# Patient Record
Sex: MALE | Race: WHITE | NOT HISPANIC OR LATINO | Employment: OTHER | URBAN - METROPOLITAN AREA
[De-identification: names, ages, dates, MRNs, and addresses within clinical notes are randomized per-mention and may not be internally consistent; named-entity substitution may affect disease eponyms.]

---

## 2024-08-26 DIAGNOSIS — G40.909 SEIZURE DISORDER: Primary | ICD-10-CM

## 2025-01-02 ENCOUNTER — OFFICE VISIT (OUTPATIENT)
Dept: NEUROLOGY | Facility: CLINIC | Age: 55
End: 2025-01-02
Payer: OTHER GOVERNMENT

## 2025-01-02 VITALS
HEIGHT: 70 IN | DIASTOLIC BLOOD PRESSURE: 80 MMHG | BODY MASS INDEX: 25.05 KG/M2 | SYSTOLIC BLOOD PRESSURE: 110 MMHG | WEIGHT: 175 LBS

## 2025-01-02 DIAGNOSIS — G40.909 SEIZURE DISORDER: Primary | ICD-10-CM

## 2025-01-02 DIAGNOSIS — Z98.890 HISTORY OF CRANIOTOMY: ICD-10-CM

## 2025-01-02 DIAGNOSIS — Q04.6 COLLOID CYST OF THIRD VENTRICLE: ICD-10-CM

## 2025-01-02 PROCEDURE — 99999 PR PBB SHADOW E&M-EST. PATIENT-LVL III: CPT | Mod: PBBFAC,,, | Performed by: SPECIALIST

## 2025-01-02 PROCEDURE — 99213 OFFICE O/P EST LOW 20 MIN: CPT | Mod: PBBFAC | Performed by: SPECIALIST

## 2025-01-02 PROCEDURE — 99205 OFFICE O/P NEW HI 60 MIN: CPT | Mod: S$PBB,,, | Performed by: SPECIALIST

## 2025-01-02 RX ORDER — EMPAGLIFLOZIN 10 MG/1
TABLET, FILM COATED ORAL
COMMUNITY

## 2025-01-02 RX ORDER — ZONISAMIDE 100 MG/1
1 CAPSULE ORAL 2 TIMES DAILY
COMMUNITY

## 2025-01-02 RX ORDER — ROSUVASTATIN CALCIUM 20 MG/1
1 TABLET, COATED ORAL DAILY
COMMUNITY

## 2025-01-02 RX ORDER — PIOGLITAZONEHYDROCHLORIDE 30 MG/1
1 TABLET ORAL DAILY
COMMUNITY

## 2025-01-02 RX ORDER — SILDENAFIL 100 MG/1
TABLET, FILM COATED ORAL
COMMUNITY

## 2025-01-02 RX ORDER — TESTOSTERONE CYPIONATE 200 MG/ML
200 INJECTION, SOLUTION INTRAMUSCULAR
COMMUNITY
Start: 2024-11-26

## 2025-01-02 RX ORDER — LOSARTAN POTASSIUM 50 MG/1
1 TABLET ORAL DAILY
COMMUNITY

## 2025-01-02 RX ORDER — ANASTROZOLE 1 MG/1
TABLET ORAL
COMMUNITY

## 2025-01-02 RX ORDER — CITALOPRAM 20 MG/1
1 TABLET, FILM COATED ORAL DAILY
COMMUNITY

## 2025-01-02 NOTE — PROGRESS NOTES
"Subjective:      @Patient ID: Geoffrey Frank is a 54 y.o. male.    Chief Complaint/referral reason/HPI:   Chief Complaint   Patient presents with    Seizures     New pt ref by Dr. Alcantar. Pt states last know sz was a "couple years ago".   Pt states that he has been having brain fog.   Pt states that he has had episodes where he has been driving an forgot where he was going "many years ago" ; not lately        Roxanne PINEDA hx comments:  Remote brain surgery in [place] when was in .  Post op had meningitis and 'was sick for years'   had  shunt for awhile then "the [employer] decided to take it out"     [facility] then [facility] in [place]     he will see him again in one year         See also 'facesheet' under media for handwritten notes     Review of Systems         Marital status: single, never   Retired, worked as:   Drives        -------------------------------------    Diabetes mellitus    High cholesterol    Hypertension    Leaky heart valve     ..  Current Outpatient Medications   Medication Instructions    anastrozole (ARIMIDEX) 1 mg Tab take 1 tab every mon, wed and thurs    citalopram (CELEXA) 20 MG tablet 1 tablet, Daily    JARDIANCE 10 mg tablet Take 1 tablet every day by oral route for 90 days.    losartan (COZAAR) 50 MG tablet 1 tablet, Daily    pioglitazone (ACTOS) 30 MG tablet 1 tablet, Daily    rosuvastatin (CRESTOR) 20 MG tablet 1 tablet, Daily    sildenafiL (VIAGRA) 100 MG tablet TAKE 1 TABLET BY MOUTH ONCE DAILY 1 HOUR PRIOR TO SEX    testosterone cypionate (DEPOTESTOTERONE CYPIONATE) 200 mg, Every 7 days    zonisamide (ZONEGRAN) 100 MG Cap 1 capsule, 2 times daily      Zns 200mg qam     Objective:      Exam:   Visit Vitals  /80 (BP Location: Left arm)   Ht 5' 10" (1.778 m)   Wt 79.4 kg (175 lb)   BMI 25.11 kg/m²     General Exam  If Accompanied, by__       body habitus_ Body mass index is 25.11 kg/m².  Gen exam   RRR    Neurological:  Exam comments:  CN's: palp " fissure asymmetry R greater than L   EOM's ok   Vf ok   Hearing ok   Speech: clear   Motor: no deficits noted   Coord: F to N ok   Reflexes: normal KJ's AJ's   Plantars: flat   Sensation: vib ok   Gait: ok          Neuroimaging:   Images and imaging reports reviewed.   Rads summary:  My comments: R fr convexity malacia and hyperdense cyst likely colloid cyst anterior 3rd    some hydrocephalus     Labs:    New Patient             Complexity of Data:    High  Risks:  High  MDM:    High      despite no orders         Assessment/Plan:         ICD-10-CM ICD-9-CM   1. Seizure disorder  G40.909 345.90   2. History of craniotomy  Z98.890 V45.89   3. Colloid cyst of third ventricle  Q04.6 742.4         Other Comments / Follow Up:      He anticipates ct scan in May 2025   Ask him to come here in June (he sees nsurg in May)               Ras Albarado MD YING FAAN, [facility] Medical Director   [facility]